# Patient Record
Sex: FEMALE | Race: WHITE | Employment: FULL TIME | URBAN - METROPOLITAN AREA
[De-identification: names, ages, dates, MRNs, and addresses within clinical notes are randomized per-mention and may not be internally consistent; named-entity substitution may affect disease eponyms.]

---

## 2020-09-23 ENCOUNTER — TELEPHONE (OUTPATIENT)
Dept: OBGYN CLINIC | Age: 35
End: 2020-09-23

## 2020-09-23 ENCOUNTER — OFFICE VISIT (OUTPATIENT)
Dept: OBGYN CLINIC | Age: 35
End: 2020-09-23
Payer: OTHER GOVERNMENT

## 2020-09-23 VITALS — DIASTOLIC BLOOD PRESSURE: 92 MMHG | SYSTOLIC BLOOD PRESSURE: 142 MMHG | WEIGHT: 209 LBS

## 2020-09-23 DIAGNOSIS — Z01.419 WELL FEMALE EXAM WITH ROUTINE GYNECOLOGICAL EXAM: Primary | ICD-10-CM

## 2020-09-23 PROCEDURE — 99395 PREV VISIT EST AGE 18-39: CPT | Performed by: OBSTETRICS & GYNECOLOGY

## 2020-09-23 RX ORDER — MEDROXYPROGESTERONE ACETATE 10 MG/1
10 TABLET ORAL DAILY
Qty: 30 TAB | Refills: 4 | Status: SHIPPED | OUTPATIENT
Start: 2020-09-23 | End: 2020-10-03

## 2020-09-23 RX ORDER — MEDROXYPROGESTERONE ACETATE 10 MG/1
10 TABLET ORAL DAILY
Qty: 30 TAB | Refills: 4 | Status: SHIPPED | OUTPATIENT
Start: 2020-09-23 | End: 2020-09-23

## 2020-09-23 RX ORDER — CETIRIZINE HYDROCHLORIDE 10 MG/1
CAPSULE, LIQUID FILLED ORAL
COMMUNITY
End: 2021-06-02 | Stop reason: ALTCHOICE

## 2020-09-23 NOTE — PROGRESS NOTES
Annual exam ages 21-44    [facility] is a No obstetric history on file. ,  28 y.o. female   Patient's last menstrual period was 2020. She presents for her annual checkup. She is having significant amenorrhea. 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 Menstrual status:    Her periods had been normal and regular until 2020. Contraception:    The current method of family planning is none. Sexual history:    She  reports being sexually active. She reports using the following method of birth control/protection: None. Medical conditions:    Since her last annual GYN exam about five years ago, she has not the following changes in her health history: none. Surgical history confirmed with patient. has a past surgical history that includes hx  section. Pap and Mammogram History:    No pap on file- pap today. The patient has never had a mammogram.    Breast Cancer History/Substance Abuse: negative    Past Medical History:   Diagnosis Date    Abnormal Papanicolaou smear of cervix      per pt HPV POS     Past Surgical History:   Procedure Laterality Date    HX  SECTION         Current Outpatient Medications   Medication Sig Dispense Refill    Cetirizine (ZyrTEC) 10 mg cap Take  by mouth.  ALPRAZolam (XANAX) 0.5 mg tablet Take 1 Tab by mouth nightly. Max Daily Amount: 0.5 mg. Prn insominia 30 Tab 5    buPROPion XL (WELLBUTRIN XL) 150 mg tablet Take 1 Tab by mouth every morning. Indications: ANXIETY WITH DEPRESSION 30 Tab 0    medroxyPROGESTERone (PROVERA) 10 mg tablet Take 1 Tab by mouth daily. 30 Tab 11    nystatin-triamcinolone (MYCOLOG) 100,000-0.1 unit/gram-% ointment Apply  to affected area two (2) times a day. 30 g 0     Allergies: Patient has no known allergies. Tobacco History:  reports that she has never smoked.  She has never used smokeless tobacco.  Alcohol Abuse:  reports no history of alcohol use.  Drug Abuse:  reports no history of drug use.     Family Medical/Cancer History:   Family History   Problem Relation Age of Onset    No Known Problems Mother         Review of Systems - History obtained from the patient  Constitutional: negative for weight loss, fever, night sweats  HEENT: negative for hearing loss, earache, congestion, snoring, sorethroat  CV: negative for chest pain, palpitations, edema  Resp: negative for cough, shortness of breath, wheezing  GI: negative for change in bowel habits, abdominal pain, black or bloody stools  : negative for frequency, dysuria, hematuria, vaginal discharge  MSK: negative for back pain, joint pain, muscle pain  Breast: negative for breast lumps, nipple discharge, galactorrhea  Skin :negative for itching, rash, hives  Neuro: negative for dizziness, headache, confusion, weakness  Psych: negative for anxiety, depression, change in mood  Heme/lymph: negative for bleeding, bruising, pallor    Physical Exam    Visit Vitals  BP (!) 142/92   Wt 209 lb (94.8 kg)   LMP 07/02/2020       Constitutional  · Appearance: well-nourished, well developed, alert, in no acute distress    HENT  · Head and Face: appears normal    Neck  · Inspection/Palpation: normal appearance, no masses or tenderness  · Lymph Nodes: no lymphadenopathy present  · Thyroid: gland size normal, nontender, no nodules or masses present on palpation    Chest  · Respiratory Effort: breathing unlabored  · Auscultation: normal breath sounds    Cardiovascular  · Heart:  · Auscultation: regular rate and rhythm without murmur    Breasts  · Inspection of Breasts: breasts symmetrical, no skin changes, no discharge present, nipple appearance normal, no skin retraction present  · Palpation of Breasts and Axillae: no masses present on palpation, no breast tenderness  · Axillary Lymph Nodes: no lymphadenopathy present    Gastrointestinal  · Abdominal Examination: abdomen non-tender to palpation, normal bowel sounds, no masses present  · Liver and spleen: no hepatomegaly present, spleen not palpable  · Hernias: no hernias identified    Genitourinary  · External Genitalia: normal appearance for age, no discharge present, no tenderness present, three 1-2 cm condyloma  · Vagina: normal vaginal vault without central or paravaginal defects, no discharge present, no inflammatory lesions present, no masses present  · Bladder: non-tender to palpation  · Urethra: appears normal  · Cervix: normal   · Uterus: normal size, shape and consistency  · Adnexa: no adnexal tenderness present, no adnexal masses present  · Perineum: perineum within normal limits, no evidence of trauma, no rashes or skin lesions present  · Anus: anus within normal limits, no hemorrhoids present  · Inguinal Lymph Nodes: no lymphadenopathy present    Skin  · General Inspection: no rash, no lesions identified    Neurologic/Psychiatric  · Mental Status:  · Orientation: grossly oriented to person, place and time  · Mood and Affect: mood normal, affect appropriate    . Assessment:  Routine gynecologic examination  Her current medical status is satisfactory with no evidence of significant gynecologic issues. Amenorrhea- will give provera withdrawal and will rto for 21 day progesterone and will draw pcos labs at that visit as well (17-oh progesterone, free and total testosterone, tsh, prolactin, sbbg).   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

## 2020-09-23 NOTE — PATIENT INSTRUCTIONS
Pelvic Exam: Care Instructions  Your Care Instructions     When your doctor examines all of your pelvic organs, it's called a pelvic exam. Two good reasons to have this kind of exam are to check for sexually transmitted infections (STIs) and to get a Pap test. A Pap test is also called a Pap smear. It checks for early changes that can lead to cancer of the cervix. Sometimes a pelvic exam is part of a regular checkup. Your doctor may ask you to avoid vaginal sex, tampons, vaginal medicines, vaginal sprays or powders, and douching for 1 to 2 days before the test.  Other times, women have this kind of exam at any time of the month. This is because they have pelvic pain, bleeding, or discharge. Or they may have another pelvic problem. Before your exam, it's important to share some information with your doctor. For example, if you are a survivor of rape or sexual abuse, you can talk about any concerns you may have. Your doctor will also want to know if you are pregnant or use birth control. And he or she will want to hear about any problems, surgeries, or procedures you have had in your pelvic area. You will also need to tell your doctor when your last period was. Follow-up care is a key part of your treatment and safety. Be sure to make and go to all appointments, and call your doctor if you are having problems. It's also a good idea to know your test results and keep a list of the medicines you take. How is a pelvic exam done? · During a pelvic exam, you will:  ? Take off your clothes below the waist. You will get a paper or cloth cover to put over the lower half of your body. If this is regular checkup, you may undress completely and put on a gown. ? Lie on your back on an exam table. Your feet will be raised above you. Stirrups will support your feet. · The doctor will:  ? Ask you to relax your knees. Your knees need to lean out, toward the walls. ?  Check the opening of your vagina for sores or swelling. ? Gently put a tool called a speculum into your vagina. It opens the vagina a little bit. You will feel some pressure. But if you are relaxed, it will not hurt. It lets your doctor see inside the vagina. ? Use a small brush, spatula, or swab to get a sample of cells, if you are having a Pap test or culture. The doctor then removes the speculum. ? Put on gloves and put one or two fingers of one hand into your vagina. The other hand goes on your lower belly. This lets your doctor feel your pelvic organs. You will probably feel some pressure. Try to stay relaxed. ? Put one gloved finger into your rectum and one into your vagina, if needed. This can also help check your pelvic organs. This exam takes about 10 minutes. At the end, you will get a washcloth or tissue to clean your vaginal area. You can then get dressed. Why is a pelvic exam done? A pelvic exam may be done:  · As part of a woman's regular physical checkup. The exam may include a Pap test.  · To check for vaginal infection. · To check for sexually transmitted infections, such as chlamydia or herpes. · To help find the cause of abnormal uterine bleeding. · To look for problems like uterine fibroids, ovarian cysts, or uterine prolapse. · To find the cause of pelvic or belly pain. · Before inserting an intrauterine device (IUD) for birth control. · To collect evidence if you've been sexually assaulted. What are the risks of a pelvic exam?  There is a small chance that the doctor will find something on a pelvic exam that would not have caused a problem. This is called overdiagnosis. It could lead to tests or treatment you don't need. When should you call for help? Watch closely for changes in your health, and be sure to contact your doctor if you have any problems. Where can you learn more? Go to http://kelly-nicolás.info/  Enter M421 in the search box to learn more about \"Pelvic Exam: Care Instructions. \"  Current as of: November 8, 2019               Content Version: 12.6  © 9314-1039 [employer]. Care instructions adapted under license by [employer] (which disclaims liability or warranty for this information). If you have questions about a medical condition or this instruction, always ask your healthcare professional. [address] any warranty or liability for your use of this information.

## 2020-10-06 LAB
CYTOLOGIST CVX/VAG CYTO: NORMAL
CYTOLOGY CVX/VAG DOC CYTO: NORMAL
CYTOLOGY CVX/VAG DOC THIN PREP: NORMAL
CYTOLOGY HISTORY:: NORMAL
DX ICD CODE: NORMAL
HPV I/H RISK 1 DNA CVX QL PROBE+SIG AMP: NEGATIVE
Lab: NORMAL
OTHER STN SPEC: NORMAL
STAT OF ADQ CVX/VAG CYTO-IMP: NORMAL

## 2020-10-13 ENCOUNTER — OFFICE VISIT (OUTPATIENT)
Dept: OBGYN CLINIC | Age: 35
End: 2020-10-13
Payer: OTHER GOVERNMENT

## 2020-10-13 DIAGNOSIS — A63.0 CONDYLOMA: Primary | ICD-10-CM

## 2020-10-13 PROCEDURE — 56606 BIOPSY OF VULVA/PERINEUM: CPT | Performed by: OBSTETRICS & GYNECOLOGY

## 2020-10-13 PROCEDURE — 56605 BIOPSY OF VULVA/PERINEUM: CPT | Performed by: OBSTETRICS & GYNECOLOGY

## 2020-10-13 NOTE — PROGRESS NOTES
[facility]  OFFICE PROCEDURE PROGRESS NOTE        Chart reviewed for the following:   Sophia VALLEJO, have reviewed the History, Physical and updated the Allergic reactions for Taryn JASON [address] performed immediately prior to start of procedure:   Sophia VALLEJO, have performed the following reviews on Nicho Vallejo prior to the start of the procedure:            * Patient was identified by name and date of birth   * Agreement on procedure being performed was verified  * Risks and Benefits explained to the patient  * Procedure site verified and marked as necessary  * Patient was positioned for comfort  * Consent was signed and verified     Time: 1050am      Date of procedure: 10/13/2020    Procedure performed by:  Jesus Montalvo MD    Provider assisted by: Giuliano Gomez     Patient assisted by: self    How tolerated by patient: tolerated the procedure well with no complications    Post Procedural Pain Scale: 0 - No Hurt    Comments: none    Procedure note: Vulvar/Vaginal condyloma removal    Indications:  Nicho Vallejo is a 28 y.o. female [facility] that was found to have multiple condyloma After being presented with the risks, benefits and specific details of the procedure, she had no further questions. Procedure: The patient was placed on the table in a dorsal lithotomy position and draped in the appropriate manner. The area was prepped with Zephiran . Once cleansed, the areas were injected with 15 cc's of 1% Lidocaine without epinephrine, using a 25 gauge needle. After adequate anesthesia was reached, the lesions were grasped and easily excised with the scalpel. They ranged in size from 1 cm to 2 cm. The specimens were placed in formalin and sent to pathology for evaluation. There were a total of 4 masses removed. Pressure was applied to the biopsy site to control bleeding and no sutures were placed to close the wound.  Hemostasis was adequate with direct pressure and silver nitrate. The patient tolerated the procedure well. There were no complications. She was observed for 10 minutes and was discharged in good condition.

## 2021-03-25 ENCOUNTER — OFFICE VISIT (OUTPATIENT)
Dept: INTERNAL MEDICINE CLINIC | Age: 36
End: 2021-03-25
Payer: COMMERCIAL

## 2021-03-25 VITALS
SYSTOLIC BLOOD PRESSURE: 118 MMHG | OXYGEN SATURATION: 98 % | HEIGHT: 60 IN | BODY MASS INDEX: 42.41 KG/M2 | DIASTOLIC BLOOD PRESSURE: 80 MMHG | WEIGHT: 216 LBS | TEMPERATURE: 97.8 F | RESPIRATION RATE: 17 BRPM | HEART RATE: 84 BPM

## 2021-03-25 DIAGNOSIS — E66.9 OBESITY (BMI 30-39.9): ICD-10-CM

## 2021-03-25 DIAGNOSIS — F32.A DEPRESSION, UNSPECIFIED DEPRESSION TYPE: ICD-10-CM

## 2021-03-25 DIAGNOSIS — Z76.89 ENCOUNTER TO ESTABLISH CARE: Primary | ICD-10-CM

## 2021-03-25 DIAGNOSIS — E55.9 VITAMIN D DEFICIENCY: ICD-10-CM

## 2021-03-25 DIAGNOSIS — F41.9 ANXIETY: ICD-10-CM

## 2021-03-25 PROCEDURE — 99203 OFFICE O/P NEW LOW 30 MIN: CPT | Performed by: INTERNAL MEDICINE

## 2021-03-25 RX ORDER — HYDROXYZINE PAMOATE 25 MG/1
25 CAPSULE ORAL
Qty: 30 CAP | Refills: 0 | Status: SHIPPED | OUTPATIENT
Start: 2021-03-25 | End: 2021-04-08

## 2021-03-25 RX ORDER — ESCITALOPRAM OXALATE 10 MG/1
10 TABLET ORAL DAILY
Qty: 30 TAB | Refills: 2 | Status: SHIPPED | OUTPATIENT
Start: 2021-03-25 | End: 2021-05-06 | Stop reason: ALTCHOICE

## 2021-03-25 NOTE — PROGRESS NOTES
Health Maintenance Due   Topic Date Due    Hepatitis C Screening  Never done    Flu Vaccine (1) Never done       Chief Complaint   Patient presents with    New Patient    Depression       1. Have you been to the ER, urgent care clinic since your last visit? Hospitalized since your last visit? No    2. Have you seen or consulted any other health care providers outside of the [address] [place] [address] since your last visit? Include any pap smears or colon screening. No    3) Do you have an Advance Directive on file? no    4) Are you interested in receiving information on Advance Directives? NO      Patient is accompanied by self I have received verbal consent from Selina Johnson to discuss any/all medical information while they are present in the room.

## 2021-03-25 NOTE — PROGRESS NOTES
HISTORY OF PRESENT ILLNESS  Sigrid Ricardo is a 28 y.o. female. Pt presents to clinic today to establish care. Past medical, surgical, and family history reviewed and updated. No significant history to report. Has been experiencing more symptoms of depression recently. Has noticed decreased motivation during the day and loss of pleasure in activities she used to find enjoyable. Reports waking up experiencing panic attacks sometimes, and not able to handle everyday stressorts and busy work as well as she used to. Increased crying episodes as well. Has been dealing with several stressors in the home due to unfaithful partner. Reports has a good support system, such as 12yr old son and her co-workers. 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 Is currently trying to get pregant     Denies SI/HI. Has never been on medication before but is interested at this time. Also thinks she has some OCD tendencies as she becomes frustrated if work space is not clean. Has been seeing a counselor through [facility] for past few months which has been helpful. Trying to work on goals such as increasing activities that she enjoys, such as photography. Only other concern is occasional epigastric pain which occurs in the evenings. Taking anti gas pills has helped and taking warm baths. Does feel better after vomiting. Denies blood in vomit. Visit Vitals  /80 (BP 1 Location: Right upper arm, BP Patient Position: Sitting, BP Cuff Size: Adult)   Pulse 84   Temp 97.8 °F (36.6 °C) (Oral)   Resp 17   Ht 5' (1.524 m)   Wt 216 lb (98 kg)   LMP 02/11/2021 (Exact Date)   SpO2 98%   BMI 42.18 kg/m²       Review of Systems   Constitutional: Negative for chills and fever. HENT: Negative. Eyes: Negative. Respiratory: Negative. Cardiovascular: Negative for chest pain and palpitations. Gastrointestinal: Negative. Genitourinary: Negative. Musculoskeletal: Negative. Skin: Negative.     Neurological: Negative for dizziness. Psychiatric/Behavioral: Positive for depression. Negative for suicidal ideas. The patient is nervous/anxious. The patient does not have insomnia. Past Medical History:   Diagnosis Date    Abnormal Papanicolaou smear of cervix     2007 per pt HPV POS    Depression      Social History     Socioeconomic History    Marital status: LEGALLY      Spouse name: Not on file    Number of children: Not on file    Years of education: Not on file    Highest education level: Not on file   Tobacco Use    Smoking status: Never Smoker    Smokeless tobacco: Never Used   Substance and Sexual Activity    Alcohol use: No    Drug use: No    Sexual activity: Yes     Birth control/protection: None     Past Surgical History:   Procedure Laterality Date    HX  SECTION       Current Outpatient Medications on File Prior to Visit   Medication Sig Dispense Refill    omega-3s/dha/epa/fish oil/D3 (VITAMIN-D + OMEGA-3 PO) Take  by mouth.  Cetirizine (ZyrTEC) 10 mg cap Take  by mouth. No current facility-administered medications on file prior to visit. Visit Vitals  /80 (BP 1 Location: Right upper arm, BP Patient Position: Sitting, BP Cuff Size: Adult)   Pulse 84   Temp 97.8 °F (36.6 °C) (Oral)   Resp 17   Ht 5' (1.524 m)   Wt 216 lb (98 kg)   LMP 2021 (Exact Date)   SpO2 98%   BMI 42.18 kg/m²     Physical Exam  Constitutional:       Appearance: Normal appearance. She is well-groomed. HENT:      Head: Normocephalic. Eyes:      General: Lids are normal.   Neck:      Musculoskeletal: Normal range of motion. Cardiovascular:      Rate and Rhythm: Normal rate and regular rhythm. Heart sounds: Normal heart sounds. Pulmonary:      Effort: Pulmonary effort is normal.      Breath sounds: Normal breath sounds. Abdominal:      General: Bowel sounds are normal.      Palpations: Abdomen is soft. Musculoskeletal: Normal range of motion.    Skin:     General: Skin is warm and dry.   Neurological:      General: No focal deficit present.   Psychiatric:         Attention and Perception: Attention normal.         Mood and Affect: Affect normal. Mood is anxious and depressed.         Speech: Speech normal.         Behavior: Behavior normal. Behavior is cooperative.         Cognition and Memory: Cognition normal.         ASSESSMENT and PLAN  Encounter Diagnoses   Name Primary?   • Encounter to establish care Yes   • Depression, unspecified depression type    • Anxiety    • Obesity (BMI 30-39.9)    • Vitamin D deficiency      Orders Placed This Encounter   • METABOLIC PANEL, COMPREHENSIVE   • CBC WITH AUTOMATED DIFF   • TSH 3RD GENERATION   • VITAMIN D, 25 HYDROXY   • HEMOGLOBIN A1C WITH EAG   • omega-3s/dha/epa/fish oil/D3 (VITAMIN-D + OMEGA-3 PO)   • escitalopram oxalate (LEXAPRO) 10 mg tablet   • hydrOXYzine pamoate (VISTARIL) 25 mg capsule     Diagnoses and all orders for this visit:    1. Encounter to establish care    2. Depression, unspecified depression type  -     METABOLIC PANEL, COMPREHENSIVE; Future  -     CBC WITH AUTOMATED DIFF; Future  -     TSH 3RD GENERATION; Future  -     escitalopram oxalate (LEXAPRO) 10 mg tablet; Take 1 Tab by mouth daily.   - Labs ordered as above.    - Start Lexapro at low dose daily. Plan, purpose, and side effects reviewed.    - Continue with counselor    3. Anxiety  -     escitalopram oxalate (LEXAPRO) 10 mg tablet; Take 1 Tab by mouth daily.  -     hydrOXYzine pamoate (VISTARIL) 25 mg capsule; Take 1 Cap by mouth three (3) times daily as needed for Anxiety for up to 14 days.   - For intermittent panic attacks, prescribed Vistaril as above. Plan, purpose and side effects reviewed.     4. Obesity (BMI 30-39.9)  -     HEMOGLOBIN A1C WITH EAG; Future    5. Vitamin D deficiency  -     VITAMIN D, 25 HYDROXY; Future      F/U based on lab reslts. F/U appt in 6 weeks for medication follow-up.     Merry Rebolledo NP                               Consent For Provider Observation  [facility] (\"[facility]\") has agreed to permit a provider employed by [facility] (\"Observer\") to observe care to patients treated in its physician practices. Your physician has agreed to permit such Observer to observe his/her patient care activities. Such observation will not include any direct participation in the care provided to you. This writer has obtained verbal permission from this patient to permit Observer to observe their medical care received at [facility]. This patient agrees that they have been given the opportunity to refuse to give such consent and that they may withdraw their consent at any time, except to the extent [facility] has relied on this consent and an Observer has already observed their medical care. This consent shall remain in effect for 1 year, unless otherwise withdrawn by this patient.

## 2021-03-26 LAB
25(OH)D3+25(OH)D2 SERPL-MCNC: 42.7 NG/ML (ref 30–100)
ALBUMIN SERPL-MCNC: 4.6 G/DL (ref 3.8–4.8)
ALBUMIN/GLOB SERPL: 1.8 {RATIO} (ref 1.2–2.2)
ALP SERPL-CCNC: 76 IU/L (ref 39–117)
ALT SERPL-CCNC: 59 IU/L (ref 0–32)
AST SERPL-CCNC: 33 IU/L (ref 0–40)
BASOPHILS # BLD AUTO: 0.1 X10E3/UL (ref 0–0.2)
BASOPHILS NFR BLD AUTO: 1 %
BILIRUB SERPL-MCNC: 0.3 MG/DL (ref 0–1.2)
BUN SERPL-MCNC: 14 MG/DL (ref 6–20)
BUN/CREAT SERPL: 24 (ref 9–23)
CALCIUM SERPL-MCNC: 9.4 MG/DL (ref 8.7–10.2)
CHLORIDE SERPL-SCNC: 103 MMOL/L (ref 96–106)
CO2 SERPL-SCNC: 25 MMOL/L (ref 20–29)
CREAT SERPL-MCNC: 0.58 MG/DL (ref 0.57–1)
EOSINOPHIL # BLD AUTO: 0.3 X10E3/UL (ref 0–0.4)
EOSINOPHIL NFR BLD AUTO: 3 %
ERYTHROCYTE [DISTWIDTH] IN BLOOD BY AUTOMATED COUNT: 13.6 % (ref 11.7–15.4)
EST. AVERAGE GLUCOSE BLD GHB EST-MCNC: 108 MG/DL
GLOBULIN SER CALC-MCNC: 2.5 G/DL (ref 1.5–4.5)
GLUCOSE SERPL-MCNC: 101 MG/DL (ref 65–99)
HBA1C MFR BLD: 5.4 % (ref 4.8–5.6)
HCT VFR BLD AUTO: 44.3 % (ref 34–46.6)
HGB BLD-MCNC: 14.6 G/DL (ref 11.1–15.9)
IMM GRANULOCYTES # BLD AUTO: 0.1 X10E3/UL (ref 0–0.1)
IMM GRANULOCYTES NFR BLD AUTO: 1 %
LYMPHOCYTES # BLD AUTO: 2.7 X10E3/UL (ref 0.7–3.1)
LYMPHOCYTES NFR BLD AUTO: 27 %
MCH RBC QN AUTO: 28.4 PG (ref 26.6–33)
MCHC RBC AUTO-ENTMCNC: 33 G/DL (ref 31.5–35.7)
MCV RBC AUTO: 86 FL (ref 79–97)
MONOCYTES # BLD AUTO: 0.6 X10E3/UL (ref 0.1–0.9)
MONOCYTES NFR BLD AUTO: 6 %
NEUTROPHILS # BLD AUTO: 6.2 X10E3/UL (ref 1.4–7)
NEUTROPHILS NFR BLD AUTO: 62 %
PLATELET # BLD AUTO: 269 X10E3/UL (ref 150–450)
POTASSIUM SERPL-SCNC: 4.2 MMOL/L (ref 3.5–5.2)
PROT SERPL-MCNC: 7.1 G/DL (ref 6–8.5)
RBC # BLD AUTO: 5.14 X10E6/UL (ref 3.77–5.28)
SODIUM SERPL-SCNC: 141 MMOL/L (ref 134–144)
TSH SERPL DL<=0.005 MIU/L-ACNC: 2.08 UIU/ML (ref 0.45–4.5)
WBC # BLD AUTO: 9.9 X10E3/UL (ref 3.4–10.8)

## 2021-03-29 NOTE — PROGRESS NOTES
Letter mailed to patient with results and recommendations from provider  Call placed to patient and left message to call back

## 2021-03-30 DIAGNOSIS — R74.8 ELEVATED LIVER ENZYMES: Primary | ICD-10-CM

## 2021-05-06 ENCOUNTER — OFFICE VISIT (OUTPATIENT)
Dept: INTERNAL MEDICINE CLINIC | Age: 36
End: 2021-05-06
Payer: COMMERCIAL

## 2021-05-06 VITALS
HEART RATE: 92 BPM | BODY MASS INDEX: 43 KG/M2 | WEIGHT: 219 LBS | HEIGHT: 60 IN | RESPIRATION RATE: 16 BRPM | SYSTOLIC BLOOD PRESSURE: 110 MMHG | TEMPERATURE: 99.3 F | DIASTOLIC BLOOD PRESSURE: 70 MMHG | OXYGEN SATURATION: 97 %

## 2021-05-06 DIAGNOSIS — F41.9 ANXIETY: ICD-10-CM

## 2021-05-06 DIAGNOSIS — F32.A DEPRESSION, UNSPECIFIED DEPRESSION TYPE: Primary | ICD-10-CM

## 2021-05-06 DIAGNOSIS — R53.83 FATIGUE, UNSPECIFIED TYPE: ICD-10-CM

## 2021-05-06 PROCEDURE — 99214 OFFICE O/P EST MOD 30 MIN: CPT | Performed by: INTERNAL MEDICINE

## 2021-05-06 RX ORDER — BUPROPION HYDROCHLORIDE 75 MG/1
75 TABLET ORAL 2 TIMES DAILY
Qty: 60 TAB | Refills: 1 | Status: SHIPPED | OUTPATIENT
Start: 2021-05-06 | End: 2021-07-08 | Stop reason: ALTCHOICE

## 2021-05-06 RX ORDER — LORATADINE AND PSEUDOEPHEDRINE SULFATE 10; 240 MG/1; MG/1
1 TABLET, EXTENDED RELEASE ORAL DAILY
COMMUNITY

## 2021-05-06 NOTE — PROGRESS NOTES
HISTORY OF PRESENT ILLNESS  Deepa Solitario is a 28 y.o. female. Patient was seen for a follow up. Was started on Lexapro a few weeks back. Reports that she doesn't necessarily feel a change, but noticed that she is not \" on edge as much\". States that she has gotten more sleep and now goes to bed earlier, but is tried when she wakes up. Reports that she needs to lose some weight as well. Has not seen psych since last visit. Reports that she only has a few visit in general and does no pair well with the therapist.   Reports some dull pain that waxes and wanes to the lower right stomach. Reports no fever or trouble when she eats. Will repeat CMP after elevated ALT. Visit Vitals  /70   Pulse 92   Temp 99.3 °F (37.4 °C) (Oral)   Resp 16   Ht 5' (1.524 m)   Wt 219 lb (99.3 kg)   LMP 2021   SpO2 97%   BMI 42.77 kg/m²     Past Medical History:   Diagnosis Date    Abnormal Papanicolaou smear of cervix      per pt HPV POS    Depression      Past Surgical History:   Procedure Laterality Date    HX  SECTION       Family History   Problem Relation Age of Onset    Depression Mother     Alcohol abuse Sister      Outpatient Encounter Medications as of 2021   Medication Sig Dispense Refill    loratadine-pseudoephedrine (Claritin-D 24 Hour)  mg per tablet Take 1 Tab by mouth daily.  buPROPion (WELLBUTRIN) 75 mg tablet Take 1 Tab by mouth two (2) times a day. 60 Tab 1    omega-3s/dha/epa/fish oil/D3 (VITAMIN-D + OMEGA-3 PO) Take  by mouth.  [DISCONTINUED] escitalopram oxalate (LEXAPRO) 10 mg tablet Take 1 Tab by mouth daily. 30 Tab 2    Cetirizine (ZyrTEC) 10 mg cap Take  by mouth. No facility-administered encounter medications on file as of 2021. HPI    Review of Systems   Constitutional: Positive for malaise/fatigue. Respiratory: Negative. Cardiovascular: Negative. Gastrointestinal: Negative. Musculoskeletal: Negative. Neurological: Negative. Psychiatric/Behavioral: Positive for depression. The patient is nervous/anxious. Physical Exam  Vitals signs and nursing note reviewed. Constitutional:       Appearance: She is obese. Cardiovascular:      Rate and Rhythm: Normal rate and regular rhythm. Pulmonary:      Effort: Pulmonary effort is normal.      Breath sounds: Normal breath sounds. Abdominal:      Palpations: Abdomen is soft. Skin:     General: Skin is warm. Neurological:      Mental Status: She is alert and oriented to person, place, and time. Psychiatric:         Behavior: Behavior normal.         ASSESSMENT and PLAN  Diagnoses and all orders for this visit:    1. Depression, unspecified depression type  -     REFERRAL TO PSYCHIATRY  -     buPROPion (WELLBUTRIN) 75 mg tablet; Take 1 Tab by mouth two (2) times a day. 2. Anxiety  -     buPROPion (WELLBUTRIN) 75 mg tablet; Take 1 Tab by mouth two (2) times a day.     3. Fatigue, unspecified type      Follow-up and Dispositions    · Return in about 3 months (around 8/6/2021), or if symptoms worsen or fail to improve, for Follow up.       lab results and schedule of future lab studies reviewed with patient  reviewed diet, exercise and weight control  reviewed medications and side effects in detail

## 2021-05-06 NOTE — PROGRESS NOTES
Chief Complaint   Patient presents with    Abnormal liver tests     elevated liver enzymes  6 week f/u         1. Have you been to the ER, urgent care clinic since your last visit? No  Hospitalized since your last visit? No    2. Have you seen or consulted any other health care providers outside of the [address] [place] [address] since your last visit? Include any pap smears or colon screening.   no

## 2021-05-07 LAB
ALBUMIN SERPL-MCNC: 4.5 G/DL (ref 3.8–4.8)
ALBUMIN/GLOB SERPL: 1.7 {RATIO} (ref 1.2–2.2)
ALP SERPL-CCNC: 80 IU/L (ref 39–117)
ALT SERPL-CCNC: 35 IU/L (ref 0–32)
AST SERPL-CCNC: 28 IU/L (ref 0–40)
BILIRUB SERPL-MCNC: 0.3 MG/DL (ref 0–1.2)
BUN SERPL-MCNC: 15 MG/DL (ref 6–20)
BUN/CREAT SERPL: 23 (ref 9–23)
CALCIUM SERPL-MCNC: 9.9 MG/DL (ref 8.7–10.2)
CHLORIDE SERPL-SCNC: 103 MMOL/L (ref 96–106)
CO2 SERPL-SCNC: 26 MMOL/L (ref 20–29)
CREAT SERPL-MCNC: 0.64 MG/DL (ref 0.57–1)
GLOBULIN SER CALC-MCNC: 2.6 G/DL (ref 1.5–4.5)
GLUCOSE SERPL-MCNC: 96 MG/DL (ref 65–99)
POTASSIUM SERPL-SCNC: 4.8 MMOL/L (ref 3.5–5.2)
PROT SERPL-MCNC: 7.1 G/DL (ref 6–8.5)
SODIUM SERPL-SCNC: 141 MMOL/L (ref 134–144)

## 2021-06-02 ENCOUNTER — OFFICE VISIT (OUTPATIENT)
Dept: SURGERY | Age: 36
End: 2021-06-02
Payer: COMMERCIAL

## 2021-06-02 VITALS
TEMPERATURE: 98 F | HEIGHT: 60 IN | BODY MASS INDEX: 43.09 KG/M2 | RESPIRATION RATE: 20 BRPM | HEART RATE: 88 BPM | SYSTOLIC BLOOD PRESSURE: 140 MMHG | WEIGHT: 219.5 LBS | OXYGEN SATURATION: 95 % | DIASTOLIC BLOOD PRESSURE: 86 MMHG

## 2021-06-02 DIAGNOSIS — E66.9 MILDLY OBESE: Primary | ICD-10-CM

## 2021-06-02 PROCEDURE — 99203 OFFICE O/P NEW LOW 30 MIN: CPT | Performed by: INTERNAL MEDICINE

## 2021-06-02 RX ORDER — PHENTERMINE HYDROCHLORIDE 37.5 MG/1
37.5 TABLET ORAL
Qty: 30 TABLET | Refills: 0 | Status: SHIPPED | OUTPATIENT
Start: 2021-06-02 | End: 2021-06-29 | Stop reason: SDUPTHER

## 2021-06-02 NOTE — PROGRESS NOTES
[facility] Medically Supervised   [facility] Loss Program     INITIAL PHYSICIAN VISIT    HISTORY OF PRESENT ILLNESS  Agree with nurse and registered dietician notes. Aristeo Gil is a 28 y.o. female with  Obesity Body mass index is 42.87 kg/m². and associated health concerns presents for evaluation and treatment of weight management. How did you hear about the [facility] program? PCP     Have you ever participated in any other weight loss programs, self directed or commercial? If so, maximum weight loss? 20lbs    Do you have any current major lifestyle changes? Normal stress       Weight History    Current weight 219. Maximum weight 239bs. Goal weight 155. Have you ever taken weight loss medications or herbal remedies? no               Have you or anyone in your family ever had weight loss surgery? no       Eating Habits  How many times a week do you eat fast food or at restaurants? Occasionally   Are you skipping meals and if so, why? Skip breakfast a lot   Which meals do you eat? Lunch, dinner and snacking   Do you have upcoming travel in the next 6 weeks? no  Do you have a history of binge eating disorder, anorexia, and bulimia? no              If yes, how long ago and how was it treated? n/a       Drinking Habits  How much caffeine do you drink daily? Not daily   How much alcohol do you drink daily? none  Do you consume any sugar-sweetened beverages (sodas, teas, juices, etc.)? Not oftne   How much water do you consume daily? Around 40 oz       Sleep Habits  Do you sleep between 7-9 hours a night? More 5 hours. Works weekends and overnight. Gets more on her days off  Do you snore? no  STOP BANG Score:  Have you been diagnosed with Sleep Apnea in the past?  Do you regularly use a CPAP machine?         Exercise  How many days a week do you currently exercise? no  Have you ever been told by a physician not to exercise? no  Do you know of any reason you shouldn't exercise? no  Do you own a pedometer or fitness tracker? no  Do you have a gym membership? no  What's your favorite physical activity? walking       Other History  Any history of drug abuse or dependence? no  Are you pregnant or planning on becoming pregnant within 6 months? no  How many times have you been pregnant? 1  Any potential unsupportive people in your life? no  Are you ready to lose weight and become healthier? yes       Other Medical Care         ICD-10-CM ICD-9-CM    1. Mildly obese  E66.9 278.00 REFERRAL TO DIETITIAN      phentermine (ADIPEX-P) 37.5 mg tablet       Depression Screening    3 most recent PHQ Screens 3/25/2021   Little interest or pleasure in doing things Nearly every day   Feeling down, depressed, irritable, or hopeless Nearly every day   Total Score PHQ 2 6   Trouble falling or staying asleep, or sleeping too much Nearly every day   Feeling tired or having little energy Nearly every day   Poor appetite, weight loss, or overeating Nearly every day   Feeling bad about yourself - or that you are a failure or have let yourself or your family down Nearly every day   Trouble concentrating on things such as school, work, reading, or watching TV Nearly every day   Moving or speaking so slowly that other people could have noticed; or the opposite being so fidgety that others notice Nearly every day   Thoughts of being better off dead, or hurting yourself in some way Not at all   PHQ 9 Score 24   How difficult have these problems made it for you to do your work, take care of your home and get along with others Not difficult at all        Pt denies any contraindications to VLCD including: history of MI in the last 3 months, Type 1 DM, Type 2 DM, Liver or Kidney disease requiring protein restriction, Recent treatment for Cancer, History of Uric-acid Kidney Stone, Gout, Gall stones, Recent onset of Inflammatory Bowel Disease, or severe Food Allergies. ROS:    Review of Systems negative except as noted above in HPI.        ALLERGIES:  No Known Allergies    CURRENT MEDICATIONS:    Outpatient Medications Marked as Taking for the 21 encounter (Office Visit) with Julee Walker NP   Medication Sig Dispense Refill    phentermine (ADIPEX-P) 37.5 mg tablet Take 1 Tablet by mouth every morning. Max Daily Amount: 37.5 mg. 30 Tablet 0    loratadine-pseudoephedrine (Claritin-D 24 Hour)  mg per tablet Take 1 Tab by mouth daily.  buPROPion (WELLBUTRIN) 75 mg tablet Take 1 Tab by mouth two (2) times a day. 60 Tab 1    omega-3s/dha/epa/fish oil/D3 (VITAMIN-D + OMEGA-3 PO) Take  by mouth. PAST MEDICAL HISTORY:    Past Medical History:   Diagnosis Date    Abnormal Papanicolaou smear of cervix      per pt HPV POS    Depression     Psychotic disorder ([facility] [address]     stress anxiety       PAST SURGICAL HISTORY:    Past Surgical History:   Procedure Laterality Date    HX  SECTION         FAMILY HISTORY:    Family History   Problem Relation Age of Onset    Depression Mother     Alcohol abuse Sister        SOCIAL HISTORY:    Social History     Socioeconomic History    Marital status: LEGALLY      Spouse name: Not on file    Number of children: Not on file    Years of education: Not on file    Highest education level: Not on file   Tobacco Use    Smoking status: Never Smoker    Smokeless tobacco: Never Used   Vaping Use    Vaping Use: Never used   Substance and Sexual Activity    Alcohol use: Yes     Comment: beer/wine    Drug use: No    Sexual activity: Yes     Partners: Male     Birth control/protection: None     Social Determinants of Health     Financial Resource Strain:     Difficulty of Paying Living Expenses:    Food Insecurity:     Worried About Running Out of Food in the Last Year:     Ran Out of Food in the Last Year:    Transportation Needs:     Lack of Transportation (Medical):      Lack of Transportation (Non-Medical):    Physical Activity:     Days of Exercise per Week:     Minutes of Exercise per Session:    Stress:     Feeling of Stress :    Social Connections:     Frequency of Communication with Friends and Family:     Frequency of Social Gatherings with Friends and Family:     Attends Bahai Services:     Active Member of Clubs or Organizations:     Attends Club or Organization Meetings:     Marital Status:        IMMUNIZATIONS:    There is no immunization history on file for this patient. PHYSICAL EXAMINATION    Vital Signs    Visit Vitals  BP (!) 140/86   Pulse 88   Temp 98 °F (36.7 °C)   Resp 20   Ht 5' (1.524 m)   Wt 219 lb 8 oz (99.6 kg)   SpO2 95%   BMI 42.87 kg/m²       Weight Metrics 6/2/2021 5/6/2021 3/25/2021 9/23/2020   Weight 219 lb 8 oz 219 lb 216 lb 209 lb   BMI 42.87 kg/m2 42.77 kg/m2 42.18 kg/m2 -         General appearance - Well nourished. Well appearing. Well developed. No acute distress. Obese. Head - Normocephalic. Atraumatic. Eyes - pupils equal and reactive. Extraocular eye movements intact. Sclera anicteric. Mildly injected sclera. Ears - Hearing is grossly normal bilaterally. Nose - normal and patent. No polyps noted. No erythema. No discharge. Mouth - mucous membranes with adequate moisture. Posterior pharynx normal with cobblestone appearance. No erythema, white exudate or obstruction. Neck - supple. Midline trachea. No carotid bruits noted bilaterally. No thyromegaly noted. Chest - clear to auscultation bilaterally anteriorly and posteriorly. No wheezes. No rales or rhonchi. Breath sounds are symmetrical bilaterally. Unlabored respirations. Heart - normal rate. Regular rhythm. Normal S1, S2. No murmur noted. No rubs, clicks or gallops noted. Abdomen - soft and distended. No masses or organomegaly. No rebound, rigidity or guarding. Bowel sounds normal x 4 quadrants. No tenderness noted. Neurological - awake, alert and oriented to person, place, and time and event. Cranial nerves II through XII intact. Clear speech. Muscle strength is +5/5 x 4 extremities. Sensation is intact to light touch bilaterally. Steady gait. Heme/Lymph - peripheral pulses normal x 4 extremities. No peripheral edema is noted. Musculoskeletal - Intact x 4 extremities. Full ROM x 4 extremities. No pain with movement. Back exam - normal range of motion. No pain on palpation of the spinous processes in the cervical, thoracic, lumbar, sacral regions. No CVA tenderness. No buffalo hump noted. Skin - no rashes, erythema, ecchymosis, lacerations, abrasions, suspicious moles noted. No skin tags or moles. No acanthosis nigricans noted in the axilla or neck. Psychological -   normal behavior, dress and thought processes. Good insight. Good eye contact. Normal affect. Appropriate mood. Normal speech. DATA REVIEWED    Last labs were reviewed and stable     SEE SCANNED DOCUMENT FOR COMPLETE PANEL RESULTS. ASSESSMENT and PLAN    ICD-10-CM ICD-9-CM    1. Mildly obese  E66.9 278.00 REFERRAL TO DIETITIAN      phentermine (ADIPEX-P) 37.5 mg tablet      Discussed the need to increase water intake to half body weight. Will get more rest as able. Work hours do not allow during her 3 days back to back   Will assess food labels and see a dietician. Chart reviewed and updated. Discussed the patient's BMI with her. The BMI follow up plan is as follows: Referred to Dietitian  I have counseled this patient on diet and exercise regimens. Decrease carbohydrates (white foods, sweet foods, sweet drinks and alcohol), increase green leafy vegetables and protein (lean meats and beans) with each meal.  Avoid fried foods. Do not skip meals. Increase water intake. Avoid sugar-sweetened beverages. Get 7-8 hours uninterrupted sleep nightly. Diet prescription: VLCD (very low calorie diet)/800 calories daily using 3-4 meal replacements daily with Crystal Ferrera food/beverage products recommended.  Consume a minimum of 2 L (67 oz) of water daily while utilizing VLCD. Avoid sugar-sweetened beverages. Exercise prescription: Minimal and as tolerated while using VLCD. Sleep prescription: A goal of 7-9 hours of uninterrupted sleep is recommended to turn off the Grehlin hormone to be released from the stomach and triggers appetite while promoting weight gain. Proper rest turns on Leptin hormone to be released from white adipose tissue and promotes weight loss. Continue current medication and care. Prescriptions written and sent to pharmacy; medication side effects discussed. Reviewed and discussed [facility] lab results. [facility] to share with PCP and specialists. .     Recent office visit notes from PCP reviewed. Referrals given; patient urged to keep appointments with specialists. Weight loss goal of 5-10% in 6-12 months has shown significant improvement in obesity and its health consequences. Immunizations noted. Offered empathy, support, legitimation, prayers, partnership to patient. Praised patient for progress. Patient was offered a choice/choices in the treatment plan today. Patient expresses understanding of the plan and agrees with recommendations. More than 35  mins spent face to face with patient and more than 50% of this time spent in counseling and coordinating care and/or discussing treatment plans in reference to The encounter diagnosis was Mildly obese. Jose L Matthews has a reminder for a \"due or due soon\" health maintenance. I have asked pt to contact their primary care provider for follow-up on this health maintenance. Via Asha Renee, Maik Shah NP FOR ALLOWING ME THE PRIVILEGE TO PARTICIPATE IN THE CARE OF OUR MUTUAL PATIENT, Taryn Morales WITH RESPECT TO WEIGHT MANAGEMENT. There are no Patient Instructions on file for this visit.

## 2021-06-02 NOTE — PROGRESS NOTES
1. Have you been to the ER, urgent care clinic since your last visit? Hospitalized since your last visit? No    2. Have you seen or consulted any other health care providers outside of the [address] [place] [address] since your last visit? Include any pap smears or colon screening.  No     Neck 16.25 inches  Waist 47.25 inches  Fat 43.9%  BMI 42.7

## 2021-06-29 DIAGNOSIS — E66.9 MILDLY OBESE: ICD-10-CM

## 2021-06-29 RX ORDER — PHENTERMINE HYDROCHLORIDE 37.5 MG/1
37.5 TABLET ORAL
Qty: 30 TABLET | Refills: 0 | Status: SHIPPED | OUTPATIENT
Start: 2021-06-29 | End: 2021-08-03 | Stop reason: SDUPTHER

## 2021-07-08 ENCOUNTER — OFFICE VISIT (OUTPATIENT)
Dept: INTERNAL MEDICINE CLINIC | Age: 36
End: 2021-07-08
Payer: COMMERCIAL

## 2021-07-08 VITALS
TEMPERATURE: 97.8 F | HEIGHT: 60 IN | DIASTOLIC BLOOD PRESSURE: 88 MMHG | SYSTOLIC BLOOD PRESSURE: 124 MMHG | WEIGHT: 211 LBS | OXYGEN SATURATION: 97 % | HEART RATE: 97 BPM | BODY MASS INDEX: 41.43 KG/M2

## 2021-07-08 DIAGNOSIS — F32.A DEPRESSION, UNSPECIFIED DEPRESSION TYPE: Primary | ICD-10-CM

## 2021-07-08 DIAGNOSIS — E66.9 OBESITY (BMI 30-39.9): ICD-10-CM

## 2021-07-08 DIAGNOSIS — F41.9 ANXIETY: ICD-10-CM

## 2021-07-08 PROCEDURE — 99214 OFFICE O/P EST MOD 30 MIN: CPT | Performed by: INTERNAL MEDICINE

## 2021-07-08 RX ORDER — BUPROPION HYDROCHLORIDE 150 MG/1
150 TABLET ORAL
Qty: 60 TABLET | Refills: 1 | Status: SHIPPED | OUTPATIENT
Start: 2021-07-08 | End: 2021-09-07 | Stop reason: SDUPTHER

## 2021-07-08 NOTE — PROGRESS NOTES
Chief Complaint   Patient presents with    Depression     F/u on medication. Visit Vitals  /88 (BP 1 Location: Left arm, BP Patient Position: Sitting)   Pulse 97   Temp 97.8 °F (36.6 °C) (Oral)   Ht 5' (1.524 m)   Wt 211 lb (95.7 kg)   SpO2 97%   BMI 41.21 kg/m²     1. Have you been to the ER, urgent care clinic since your last visit? Hospitalized since your last visit?no    2. Have you seen or consulted any other health care providers outside of the [address] [place] [address] since your last visit? Include any pap smears or colon screening.  no

## 2021-07-08 NOTE — PROGRESS NOTES
HISTORY OF PRESENT ILLNESS  Charleen Bright is a 39 y.o. female. Patient was seen for a follow up at PCP. Reports that she believes that she is overall doing well. Has been taking her Wellbutrin daily, but would like to take both tablets once day. Feels like her depression and anxiety have improved. 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 States that she feels like her weightloss has been going ok. Will make a follow up. Has lost 8 lbs. Reports that he sleep could improve, but is working on it. Visit Vitals  /88 (BP 1 Location: Left arm, BP Patient Position: Sitting)   Pulse 97   Temp 97.8 °F (36.6 °C) (Oral)   Ht 5' (1.524 m)   Wt 211 lb (95.7 kg)   SpO2 97%   BMI 41.21 kg/m²     Past Medical History:   Diagnosis Date    Abnormal Papanicolaou smear of cervix      per pt HPV POS    Depression     Psychotic disorder (HCC)     stress anxiety     Past Surgical History:   Procedure Laterality Date    HX  SECTION       Family History   Problem Relation Age of Onset    Depression Mother     Alcohol abuse Sister      Outpatient Encounter Medications as of 2021   Medication Sig Dispense Refill    buPROPion XL (WELLBUTRIN XL) 150 mg tablet Take 1 Tablet by mouth every morning. 60 Tablet 1    phentermine (ADIPEX-P) 37.5 mg tablet Take 1 Tablet by mouth every morning. Max Daily Amount: 37.5 mg. 30 Tablet 0    loratadine-pseudoephedrine (Claritin-D 24 Hour)  mg per tablet Take 1 Tab by mouth daily.  omega-3s/dha/epa/fish oil/D3 (VITAMIN-D + OMEGA-3 PO) Take  by mouth.  [DISCONTINUED] buPROPion (WELLBUTRIN) 75 mg tablet Take 1 Tab by mouth two (2) times a day. 60 Tab 1     No facility-administered encounter medications on file as of 2021. HPI    Review of Systems   Constitutional: Negative. Respiratory: Negative. Cardiovascular: Negative. Gastrointestinal: Negative. Musculoskeletal: Negative. Neurological: Negative. Psychiatric/Behavioral: Positive for depression. The patient is nervous/anxious. Physical Exam  Vitals and nursing note reviewed. Constitutional:       Appearance: She is not ill-appearing. HENT:      Head: Normocephalic. Cardiovascular:      Rate and Rhythm: Normal rate and regular rhythm. Pulmonary:      Effort: Pulmonary effort is normal.      Breath sounds: Normal breath sounds. Abdominal:      General: Bowel sounds are normal.      Palpations: Abdomen is soft. Skin:     General: Skin is warm. Neurological:      Mental Status: She is alert and oriented to person, place, and time. Psychiatric:         Behavior: Behavior normal.         ASSESSMENT and PLAN  Diagnoses and all orders for this visit:    1. Depression, unspecified depression type  -     buPROPion XL (WELLBUTRIN XL) 150 mg tablet; Take 1 Tablet by mouth every morning. 2. Anxiety    3. Obesity (BMI 30-39.9)   - will follow up at weight loss clinic.  Will see dietician tomorrow for further plan     Follow-up and Dispositions    · Return in about 6 months (around 1/8/2022), or if symptoms worsen or fail to improve, for Follow up.       lab results and schedule of future lab studies reviewed with patient  reviewed diet, exercise and weight control  reviewed medications and side effects in detail

## 2021-07-09 ENCOUNTER — HOSPITAL ENCOUNTER (OUTPATIENT)
Dept: NUTRITION | Age: 36
Discharge: HOME | End: 2021-07-09
Payer: COMMERCIAL

## 2021-07-09 DIAGNOSIS — E66.9 MILDLY OBESE: ICD-10-CM

## 2021-07-09 PROCEDURE — 97802 MEDICAL NUTRITION INDIV IN: CPT | Performed by: DIETITIAN, REGISTERED

## 2021-07-09 NOTE — PROGRESS NOTES
[address] [facility]     Nutrition Assessment  Medical Nutrition Therapy   Outpatient Initial Evaluation         Patient Name: Ameena Monique : 1985   Treatment Diagnosis: E66.9 (ICD-10-CM) - Mildly obese   Referral Source: Karrie Bermeo NP Start of Care [facility]): 2021     In time:   1000am            Out time:   1055am   Total Treatment Time (min):   55 min     Gender: female Age: 39 y.o. Ht: 60 in Wt:  214 lb 97.1 kg   BMI: 41.8 BMR   Male  BMR Female [id number]     Past Medical History:  Patient Active Problem List   Diagnosis Code    Depression F32.9    Anxiety F41.9        Pertinent Medications:     Current Outpatient Medications:     buPROPion XL (WELLBUTRIN XL) 150 mg tablet, Take 1 Tablet by mouth every morning., Disp: 60 Tablet, Rfl: 1    phentermine (ADIPEX-P) 37.5 mg tablet, Take 1 Tablet by mouth every morning. Max Daily Amount: 37.5 mg., Disp: 30 Tablet, Rfl: 0    loratadine-pseudoephedrine (Claritin-D 24 Hour)  mg per tablet, Take 1 Tab by mouth daily. , Disp: , Rfl:     omega-3s/dha/epa/fish oil/D3 (VITAMIN-D + OMEGA-3 PO), Take  by mouth., Disp: , Rfl:      Biochemical Data:   Lab Results   Component Value Date/Time    Hemoglobin A1c 5.4 2021 10:57 AM     Lab Results   Component Value Date/Time    Sodium 141 2021 11:06 AM    Potassium 4.8 2021 11:06 AM    Chloride 103 2021 11:06 AM    CO2 26 2021 11:06 AM    Glucose 96 2021 11:06 AM    BUN 15 2021 11:06 AM    Creatinine 0.64 2021 11:06 AM    BUN/Creatinine ratio 23 2021 11:06 AM    GFR est  2021 11:06 AM    GFR est non- 2021 11:06 AM    Calcium 9.9 2021 11:06 AM    Bilirubin, total 0.3 2021 11:06 AM    Alk.  phosphatase 80 2021 11:06 AM    Protein, total 7.1 2021 11:06 AM    Albumin 4.5 2021 11:06 AM    A-G Ratio 1.7 2021 11:06 AM    ALT (SGPT) 35 (H) 2021 11:06 AM    AST (SGOT) 28 05/06/2021 11:06 AM     No results found for: CHOL, CHOLPOCT, CHOLX, CHLST, CHOLV, HDL, HDLPOC, HDLP, LDL, LDLCPOC, LDLC, DLDLP, VLDLC, VLDL, TGLX, TRIGL, TRIGP, TGLPOCT, CHHD, CHHDX     Assessment:   Pt is a 38 yo female, seen today for weight loss. Pt notes that she has had a hard time losing weight since gaining after moving to [place] from [address] [place] [address]. Pt has recently started taking phentermine and has seen success while taking it. Pt is now looking to make sure she is getting proper nutrients and looking at building lasting lifestyle changes around nutrition habits. Pt works 16 hour shifts two days each week. Pt also has spouse and children to consider in meal preparation. Food & Nutrition: Recent changes Pt has made include preparing meals for work days and logging the foods on those days. Pt has reduced boredom snacking and has been working to increase her water intake. Typical work day:   B- Premier shake, granola bar  S- None   L-  Crackers w/ Pb or vegetable; snack foods  S- None   D- Tuna w/ cream cheese, crackers and vegetable   S- None   Drinks: Water at 80 oz daily    On days off from work pt has more time to prepare a meal at the dinner time. Cravings: more tendency for salty     Meals out:  1x week     Exercise: Steps tracking (10,000)  and Just Dance          Estimate Needs:    Equation( [x] MSJ ; []  HBE; [] [place]; [] other)  * Activity Factor (1.2) -500   Calories: 1500  Protein: 94 Carbs: 169 Fat: 50   Kcal/day  g/day  g/day  g/day        percent: 25  39  30               Nutrition Diagnosis Obesity R/T nutrition related knowledge deficit for healthy weight loss AEB BMI>30, food recall, and Pt questions. Nutrition Intervention &  Education: Educated pt on healthy weight loss and recommended daily intake specific to her individual needs. Self-monitoring with tracking foods on MyPlate el     Discussed recommendation for non-starchy vegetables and getting in a variety of colors.    Pt asked about salad dressing and dips. RD offered suggestions for serving sizes of the various types, lower-sugar brands of dressings, and ideas for greek yogurt dips. Discussed the role of each macronutrient in weight loss and healthy diet. Recommended fluid needs  Goal-Setting    Handouts Provided: []  Carbohydrates  []  Protein  []  Non-starchy Vegatbles  []  Food Label  [x]  Meal and Snack Ideas  []  Food Journals []  Diabetes  []  Cholesterol  []  Sodium  []  Gen Nutr Guidelines  []  SBGM Guidelines  [x]  Others: non-starchy vegetables, Meal Builder   Information Reviewed with: Pt   Readiness to Change Stage: []  Pre-contemplative    []  Contemplative  []  Preparation               [x]  Action                  []  Maintenance   Potential Barriers to Learning: []  Decline in memory    []  Language barrier   []  Other:  []  Emotional                  []  Limited mobility     [x]  None  []  Lack of motivation     [] Vision, hearing or cognitive impairment   Expected Compliance: Pt expressed comprehension, high motivation, and compliance is expected. Nutritional Goal - To promote lifestyle changes to result in:    [x]  Weight loss  []  Improved diabetic control  []  Decreased cholesterol levels  []  Decreased blood pressure  []  Weight maintenance []  Preventing any interactions associated with food allergies  []  Adequate weight gain toward goal weight  []  Other:        Patient Goals:   - Tracking foods at least 3x week w/ focus on types of nutrients. - Add in 2x 30 min sessions of Just Dance exercise on days off from work. - Focus on getting at least 2 bottles of water every day.       Dietitian Signature: Nora Brantley RDN Date: 7/9/2021   Follow-up: 4 weeks Time: 9:59 AM

## 2021-07-22 DIAGNOSIS — Z76.89 ENCOUNTER FOR WEIGHT MANAGEMENT: Primary | ICD-10-CM

## 2021-07-22 DIAGNOSIS — E66.01 MORBID OBESITY (HCC): ICD-10-CM

## 2021-07-25 NOTE — PROGRESS NOTES
Patient attended a VIRTUAL Medically Supervised Weight Loss New Patient Orientation today where we discussed:  - New Direction Very Low and the Low Calorie diet details  - Medical Supervision  - Nutrition education  - Cost of Meal Replacements  Policies and compliance required for program enrollment.

## 2021-08-03 DIAGNOSIS — E66.9 MILDLY OBESE: ICD-10-CM

## 2021-08-03 RX ORDER — PHENTERMINE HYDROCHLORIDE 37.5 MG/1
37.5 TABLET ORAL
Qty: 30 TABLET | Refills: 0 | Status: SHIPPED | OUTPATIENT
Start: 2021-08-03 | End: 2021-09-02 | Stop reason: SDUPTHER

## 2021-08-11 ENCOUNTER — HOSPITAL ENCOUNTER (OUTPATIENT)
Dept: NUTRITION | Age: 36
Discharge: HOME | End: 2021-08-11
Payer: COMMERCIAL

## 2021-08-11 DIAGNOSIS — E66.9 MILDLY OBESE: ICD-10-CM

## 2021-08-11 PROCEDURE — 97803 MED NUTRITION INDIV SUBSEQ: CPT | Performed by: DIETITIAN, REGISTERED

## 2021-08-11 NOTE — PROGRESS NOTES
NUTRITION  FOLLOW-UP TREATMENT NOTE  Patient Name: Prashanth Lama         Date: 2021  : 1985    YES Patient  Verified  Diagnosis: E66.9 (ICD-10-CM) - Mildly obese   In time:   1050am             Out time:   1120am   Total Treatment Time (min):   30     SUBJECTIVE/ASSESSMENT  Current Wt: 205.6 Previous Wt: 214 Wt Change: -8.4     Initial Wt: 214 Total Wt change: -8.4 Height: 60     Changes in medication or medical history? Any new allergies, surgeries or procedures? NO    If yes, update Summary List            Nutrition Diagnosis        Diagnosis Status:   Obesity R/T nutrition related knowledge deficit for healthy weight loss AEB BMI>30, food recall, and Pt questions. [x]  Improved []  No Change    []  Declined   []  Discontinued        Nutrition Monitoring and Evaluation: Pt seen for follow-up visit today; pt has lost 8.4# over the past 4 weeks. Pt noted that she has been busy in her routine but that she has been mindful about planning meals for the day and making sure to prioritize protein and non-starchy vegetables with the meals. Pt had a weekend of travel and packed snacks for the drive. Pt noted eating meals out but doing smaller portions than previously would have done. Pt has struggled to drink water, stating that it feels lie she is forcing herself to drink it despite the fact that she is actually thirsty. Discussed doing flavors through infusing frozen fruit. Previous Goals:   Not met. Discontinue for now, with understanding needs of various food groups and portion sizes. - Tracking foods at least 3x week w/ focus on types of nutrients. Not met. Continue. - Add in 2x 30 min sessions of Just Dance exercise on days off from work. Met 50%. - Focus on getting at least 2 bottles of water every day. Nutrition Prescription and  Intervention Educated pt on healthy weight loss and recommended daily intake specific to her individual needs.    Self-monitoring with tracking foods on MyPlate el     Discussed recommendation for non-starchy vegetables and getting in a variety of colors. Pt asked about salad dressing and dips. RD offered suggestions for serving sizes of the various types, lower-sugar brands of dressings, and ideas for greek yogurt dips. Discussed artifical sweeteners. Discussed the role of each macronutrient in weight loss and healthy diet. Recommended fluid needs  Goal-Setting      Patient Education:  [x]  Review current plan with patient   []  Other:    Handouts/  Information Provided: []  Carbohydrates  []  Protein  []  Fiber  []  Serving Sizes  []  Fluids  []  General guidelines []  Diabetes  []  Cholesterol  []  Sodium  []  SBGM  []  Food Journals  []  Others:      Patient Goals  - Add in 2x 30 min sessions of Just Dance exercise on days off from work. - Focus on getting at least 2 bottles of water every day, try adding frozen fruit to change up flavor options. - Plan a list of healthy snacks to have on hand for when school starts. PLAN  [x]  Continue on current plan []  Follow-up PRN   []  Discharge due to :    [x]  Next appt: 4 weeks.       Dietitian: Cong Cuevas RDN    Date: 8/11/2021 Time: 10:23 AM

## 2021-09-01 ENCOUNTER — HOSPITAL ENCOUNTER (OUTPATIENT)
Dept: NUTRITION | Age: 36
Discharge: HOME | End: 2021-09-01
Payer: COMMERCIAL

## 2021-09-01 DIAGNOSIS — E66.9 MILDLY OBESE: ICD-10-CM

## 2021-09-01 PROCEDURE — 97803 MED NUTRITION INDIV SUBSEQ: CPT | Performed by: DIETITIAN, REGISTERED

## 2021-09-01 NOTE — PROGRESS NOTES
NUTRITION  FOLLOW-UP TREATMENT NOTE  Patient Name: Derek Ferrer         Date: 2021  : 1985    YES Patient  Verified  Diagnosis: E66.9 (ICD-10-CM) - Mildly obese   In time:   1125am             Out time:   1155am   Total Treatment Time (min):   30     SUBJECTIVE/ASSESSMENT  Current Wt: 202.8 Previous Wt: 205.6 Wt Change: -2.8     Initial Wt: 214 Total Wt change: -8.4 Height: 60     Changes in medication or medical history? Any new allergies, surgeries or procedures? NO    If yes, update Summary List        Nutrition Diagnosis        Diagnosis Status:   Obesity R/T nutrition related knowledge deficit for healthy weight loss AEB BMI>30, food recall, and Pt questions. [x]  Improved []  No Change    []  Declined   []  Discontinued        Nutrition Monitoring and Evaluation: Pt seen for follow-up visit today; pt has lost 2.8# over the past 4 weeks. Pt has started school and had a busy schedule with school and work balance. Pt has noticed that she is not eating as much with being busy and missing meals in the middle of the day. Pt has been consistent with dinner meal each day. Pt has also resolved aversion to water but has not been remembering to drink while working in the day. Pt has been able to see changes in clothing fitting more loosely and has been reducing portion sizes at meals. Previous Goals:   Not met. Continue.- Add in 2x 30 min sessions of Just Dance exercise on days off from work. Met. - Focus on getting at least 2 bottles of water every day, try adding frozen fruit to change up flavor options. Not met. - Plan a list of healthy snacks to have on hand for when school starts. Nutrition Prescription and  Intervention Educated pt on healthy weight loss and recommended daily intake specific to her individual needs. Self-monitoring with tracking foods on MyPlate el     Discussed recommendation for non-starchy vegetables and getting in a variety of colors.    Pt asked about salad dressing and dips. RD offered suggestions for serving sizes of the various types, lower-sugar brands of dressings, and ideas for greek yogurt dips. Discussed artifical sweeteners. Discussed the role of each macronutrient in weight loss and healthy diet. Recommended fluid needs  Goal-Setting      Patient Education:  [x]  Review current plan with patient   []  Other:    Handouts/  Information Provided: []  Carbohydrates  []  Protein  []  Fiber  []  Serving Sizes  []  Fluids  []  General guidelines []  Diabetes  []  Cholesterol  []  Sodium  []  SBGM  []  Food Journals  [x]  Others: 100 calorie snack ideas. Patient Goals - Set reminders to eat meals and snacks in the day. - Add in 2x 30 min sessions of Just Dance exercise on days off from work. PLAN  [x]  Continue on current plan []  Follow-up PRN   []  Discharge due to :    [x]  Next appt: 4 weeks.       Dietitian: Chan Nieto RDN    Date: 9/1/2021 Time: 10:23 AM

## 2021-09-02 DIAGNOSIS — E66.9 MILDLY OBESE: ICD-10-CM

## 2021-09-02 RX ORDER — PHENTERMINE HYDROCHLORIDE 37.5 MG/1
37.5 TABLET ORAL
Qty: 30 TABLET | Refills: 0 | Status: SHIPPED | OUTPATIENT
Start: 2021-09-02 | End: 2021-10-07 | Stop reason: SDUPTHER

## 2021-09-15 ENCOUNTER — OFFICE VISIT (OUTPATIENT)
Dept: SURGERY | Age: 36
End: 2021-09-15
Payer: COMMERCIAL

## 2021-09-15 VITALS
RESPIRATION RATE: 18 BRPM | DIASTOLIC BLOOD PRESSURE: 88 MMHG | HEART RATE: 117 BPM | OXYGEN SATURATION: 98 % | HEIGHT: 60 IN | BODY MASS INDEX: 39.95 KG/M2 | SYSTOLIC BLOOD PRESSURE: 124 MMHG | WEIGHT: 203.5 LBS

## 2021-09-15 DIAGNOSIS — E65 OBESE ABDOMEN: ICD-10-CM

## 2021-09-15 PROCEDURE — 99214 OFFICE O/P EST MOD 30 MIN: CPT | Performed by: INTERNAL MEDICINE

## 2021-09-15 NOTE — PROGRESS NOTES
[facility] [address] [facility] Eliel Perez is a 39 y.o. female. Patient was seen for a follow up at the weight loss clinic. Patient has lost almost 20 lbs since starting the program! Feels great and is satisfied. Reports that she continues to take the phentermine daily. Often meal preps and takes her snacks with her. Has placed herself on am eating schedule to ensure she is getting enough caloris daily. States that her water intake can be better. Often snacks on nuts, bars and cheese. No too much exercise currently going on. Reports that for the next few months she will  extra shifts that will be overnight. No barriers, but is continuing to go through a divorce that is prolonged. Does have the support of her new partner. Will continue to see the dietician after payment is figured out. Visit Vitals  /88 (BP 1 Location: Left arm, BP Patient Position: Sitting)   Pulse (!) 117   Resp 18   Ht 5' (1.524 m)   Wt 203 lb 8 oz (92.3 kg)   LMP 08/15/2021   SpO2 98%   BMI 39.74 kg/m²     Past Medical History:   Diagnosis Date    Abnormal Papanicolaou smear of cervix      per pt HPV POS    Depression     Psychotic disorder (HCC)     stress anxiety     Past Surgical History:   Procedure Laterality Date    HX  SECTION       Family History   Problem Relation Age of Onset    Depression Mother     Alcohol abuse Sister      Outpatient Encounter Medications as of 9/15/2021   Medication Sig Dispense Refill    buPROPion XL (WELLBUTRIN XL) 150 mg tablet Take 1 Tablet by mouth every morning. 90 Tablet 1    phentermine (ADIPEX-P) 37.5 mg tablet Take 1 Tablet by mouth every morning. Max Daily Amount: 37.5 mg. 30 Tablet 0    loratadine-pseudoephedrine (Claritin-D 24 Hour)  mg per tablet Take 1 Tab by mouth daily.  omega-3s/dha/epa/fish oil/D3 (VITAMIN-D + OMEGA-3 PO) Take  by mouth.        No facility-administered encounter medications on file as of 9/15/2021. Outpatient Encounter Medications as of 9/15/2021   Medication Sig Dispense Refill    buPROPion XL (WELLBUTRIN XL) 150 mg tablet Take 1 Tablet by mouth every morning. 90 Tablet 1    phentermine (ADIPEX-P) 37.5 mg tablet Take 1 Tablet by mouth every morning. Max Daily Amount: 37.5 mg. 30 Tablet 0    loratadine-pseudoephedrine (Claritin-D 24 Hour)  mg per tablet Take 1 Tab by mouth daily.  omega-3s/dha/epa/fish oil/D3 (VITAMIN-D + OMEGA-3 PO) Take  by mouth. No facility-administered encounter medications on file as of 9/15/2021. HPI    Review of Systems   Constitutional: Negative. Respiratory: Negative. Cardiovascular: Negative. Gastrointestinal: Negative. Genitourinary: Negative. Musculoskeletal: Negative. Neurological: Negative. Psychiatric/Behavioral: Negative. Physical Exam  Vitals and nursing note reviewed. Constitutional:       Appearance: She is obese. HENT:      Head: Normocephalic. Cardiovascular:      Rate and Rhythm: Normal rate and regular rhythm. Pulmonary:      Effort: Pulmonary effort is normal.      Breath sounds: Normal breath sounds. Abdominal:      Palpations: Abdomen is soft. Skin:     General: Skin is warm. Neurological:      Mental Status: She is alert and oriented to person, place, and time. Psychiatric:         Behavior: Behavior normal.         ASSESSMENT and PLAN  Diagnoses and all orders for this visit:    1. BMI 39.0-39.9,adult  - will continue on phentermine daily. -encouraged healthy snacks throughout the day (went over sources of protein)  - encouraged an increase in water intake to 1- 1/12 L a day  -will increase sleep as tolerated to 6-7 hours daily. Awaiting work schedule to be determined   -encouraged some form of exercise at least 30-45 minutes a week   -will follow up with dietician as scheduled.    2. Obese abdomen      Follow-up and Dispositions    · Return in about 1 month (around 10/15/2021), or if symptoms worsen or fail to improve, for Follow up.       lab results and schedule of future lab studies reviewed with patient  reviewed diet, exercise and weight control  reviewed medications and side effects in detail

## 2021-10-07 DIAGNOSIS — E66.9 MILDLY OBESE: ICD-10-CM

## 2021-10-07 RX ORDER — PHENTERMINE HYDROCHLORIDE 37.5 MG/1
37.5 TABLET ORAL
Qty: 30 TABLET | Refills: 0 | Status: SHIPPED | OUTPATIENT
Start: 2021-10-07 | End: 2021-11-04 | Stop reason: SDUPTHER

## 2021-10-12 ENCOUNTER — APPOINTMENT (OUTPATIENT)
Dept: NUTRITION | Age: 36
End: 2021-10-12

## 2021-10-20 ENCOUNTER — OFFICE VISIT (OUTPATIENT)
Dept: SURGERY | Age: 36
End: 2021-10-20
Payer: COMMERCIAL

## 2021-10-20 VITALS
TEMPERATURE: 98.4 F | BODY MASS INDEX: 38.77 KG/M2 | HEIGHT: 60 IN | DIASTOLIC BLOOD PRESSURE: 68 MMHG | SYSTOLIC BLOOD PRESSURE: 115 MMHG | HEART RATE: 112 BPM | OXYGEN SATURATION: 98 % | RESPIRATION RATE: 18 BRPM | WEIGHT: 197.5 LBS

## 2021-10-20 DIAGNOSIS — F32.A DEPRESSION, UNSPECIFIED DEPRESSION TYPE: ICD-10-CM

## 2021-10-20 PROCEDURE — 99214 OFFICE O/P EST MOD 30 MIN: CPT | Performed by: INTERNAL MEDICINE

## 2021-10-20 RX ORDER — BUPROPION HYDROCHLORIDE 150 MG/1
300 TABLET ORAL
Qty: 90 TABLET | Refills: 1 | Status: SHIPPED | OUTPATIENT
Start: 2021-10-20 | End: 2021-12-29 | Stop reason: SDUPTHER

## 2021-10-20 NOTE — PROGRESS NOTES
[facility] at [address] is a 39 y.o. female. Patient was seen for a follow up. Has lost another 5 lbs since last visit. Overall feels great. Reports that he daily intake fluctuates between 7520-9923 calories a day. Patient states that at times she has no appetite, but does eat at least 2 meals, of snacks. Needs to increase her water intake. Just got engaged last night. Is working day shifts, but picking up extra shifts weekly for the incentive pay. Reports about 4-5 hours daily, and tries to sleep more during the weekend. Also reports that at times she finds her mood fluctuating. Does not feel anxious, but still feels lows and highs often with her moods. Is currently seeing me in PCP for depression as well. Would like to consider going up on this. No thoughts on harming herself or others. Visit Vitals  /68 (BP 1 Location: Right arm)   Pulse (!) 112   Temp 98.4 °F (36.9 °C)   Resp 18   Ht 5' (1.524 m)   Wt 197 lb 8 oz (89.6 kg)   SpO2 98%   BMI 38.57 kg/m²     Past Medical History:   Diagnosis Date    Abnormal Papanicolaou smear of cervix      per pt HPV POS    Depression     Psychotic disorder ([address]     stress anxiety     Past Surgical History:   Procedure Laterality Date    HX  SECTION       Family History   Problem Relation Age of Onset    Depression Mother     Alcohol abuse Sister      Outpatient Encounter Medications as of 10/20/2021   Medication Sig Dispense Refill    buPROPion XL (WELLBUTRIN XL) 150 mg tablet Take 2 Tablets by mouth every morning. 90 Tablet 1    phentermine (ADIPEX-P) 37.5 mg tablet Take 1 Tablet by mouth every morning. Max Daily Amount: 37.5 mg. 30 Tablet 0    loratadine-pseudoephedrine (Claritin-D 24 Hour)  mg per tablet Take 1 Tab by mouth daily.  omega-3s/dha/epa/fish oil/D3 (VITAMIN-D + OMEGA-3 PO) Take  by mouth.       [DISCONTINUED] buPROPion XL [facility] XL) 150 mg tablet Take 1 Tablet by mouth every morning. 90 Tablet 1     No facility-administered encounter medications on file as of 10/20/2021. HPI    Review of Systems   Constitutional: Negative. Respiratory: Negative. Cardiovascular: Negative. Gastrointestinal: Negative. Genitourinary: Negative. Neurological: Negative. Psychiatric/Behavioral: Positive for depression. Physical Exam  Vitals and nursing note reviewed. Cardiovascular:      Rate and Rhythm: Normal rate and regular rhythm. Pulmonary:      Effort: Pulmonary effort is normal.      Breath sounds: Normal breath sounds. Musculoskeletal:         General: Normal range of motion. Skin:     General: Skin is warm. Neurological:      Mental Status: She is alert and oriented to person, place, and time. Psychiatric:         Behavior: Behavior normal.         Thought Content: Thought content normal.         ASSESSMENT and PLAN  Diagnoses and all orders for this visit:    1. BMI 39.0-39.9,adult        -     Continue phentermine 37.5 mg daily        -     Continue water intake to reach 2L daily         -     Encourage more sleep based on schedule           2. Depression, unspecified depression type  -     buPROPion XL (WELLBUTRIN XL) 150 mg tablet; Take 2 Tablets by mouth every morning.       Follow-up and Dispositions    · Return in about 1 month (around 11/20/2021), or if symptoms worsen or fail to improve, for Follow up.       lab results and schedule of future lab studies reviewed with patient  reviewed diet, exercise and weight control  reviewed medications and side effects in detail

## 2021-10-20 NOTE — PROGRESS NOTES
1. Have you been to the ER, urgent care clinic since your last visit? Hospitalized since your last visit? No    2. Have you seen or consulted any other health care providers outside of the [address] [place] [address] since your last visit? Include any pap smears or colon screening.  No

## 2021-11-04 DIAGNOSIS — E66.9 MILDLY OBESE: ICD-10-CM

## 2021-11-04 RX ORDER — PHENTERMINE HYDROCHLORIDE 37.5 MG/1
37.5 TABLET ORAL
Qty: 30 TABLET | Refills: 0 | Status: SHIPPED | OUTPATIENT
Start: 2021-11-04 | End: 2022-01-14

## 2021-11-17 ENCOUNTER — OFFICE VISIT (OUTPATIENT)
Dept: SURGERY | Age: 36
End: 2021-11-17
Payer: COMMERCIAL

## 2021-11-17 VITALS
TEMPERATURE: 98 F | WEIGHT: 193.8 LBS | OXYGEN SATURATION: 96 % | HEART RATE: 114 BPM | BODY MASS INDEX: 38.05 KG/M2 | RESPIRATION RATE: 16 BRPM | SYSTOLIC BLOOD PRESSURE: 118 MMHG | DIASTOLIC BLOOD PRESSURE: 88 MMHG | HEIGHT: 60 IN

## 2021-11-17 DIAGNOSIS — E66.01 CLASS 2 SEVERE OBESITY DUE TO EXCESS CALORIES WITH SERIOUS COMORBIDITY AND BODY MASS INDEX (BMI) OF 37.0 TO 37.9 IN ADULT (HCC): Primary | ICD-10-CM

## 2021-11-17 PROCEDURE — 99214 OFFICE O/P EST MOD 30 MIN: CPT | Performed by: INTERNAL MEDICINE

## 2021-11-17 NOTE — PROGRESS NOTES
1. Have you been to the ER, urgent care clinic since your last visit? Hospitalized since your last visit? No    2. Have you seen or consulted any other health care providers outside of the [address] [place] [address] since your last visit? Include any pap smears or colon screening.  No

## 2021-11-17 NOTE — PROGRESS NOTES
[facility] at [address] [facility] Sadaf Gregory is a 39 y.o. female. Patient was seen for a follow up. Has lost another 3 lbs since her last visit. Has lost a total of 26 lbs since starting. Feels good and is ready to keep going. Reports that this week she felt a lot less hungry, but still ate. Knows that her water intake as well has been down. Is loving yogurt with blueberries most of the time. Is looking forward to her wedding next year and just got a new car. Is having non scale victories. Is able to fit in her son josé miguel. Denies any N/V or GI pain  Visit Vitals  /88 (BP 1 Location: Left arm)   Pulse (!) 114   Temp 98 °F (36.7 °C)   Resp 16   Ht 5' (1.524 m)   Wt 193 lb 12.8 oz (87.9 kg)   SpO2 96%   BMI 37.85 kg/m²     Past Medical History:   Diagnosis Date    Abnormal Papanicolaou smear of cervix      per pt HPV POS    Depression     Psychotic disorder (HCC)     stress anxiety     Past Surgical History:   Procedure Laterality Date    HX  SECTION       Family History   Problem Relation Age of Onset    Depression Mother     Alcohol abuse Sister      Outpatient Encounter Medications as of 2021   Medication Sig Dispense Refill    phentermine (ADIPEX-P) 37.5 mg tablet Take 1 Tablet by mouth every morning. Max Daily Amount: 37.5 mg. 30 Tablet 0    buPROPion XL (WELLBUTRIN XL) 150 mg tablet Take 2 Tablets by mouth every morning. 90 Tablet 1    loratadine-pseudoephedrine (Claritin-D 24 Hour)  mg per tablet Take 1 Tab by mouth daily.  omega-3s/dha/epa/fish oil/D3 (VITAMIN-D + OMEGA-3 PO) Take  by mouth. No facility-administered encounter medications on file as of 2021. HPI    Review of Systems   All other systems reviewed and are negative. Physical Exam  Vitals and nursing note reviewed. Cardiovascular:      Rate and Rhythm: Normal rate and regular rhythm.    Pulmonary: Effort: Pulmonary effort is normal.      Breath sounds: Normal breath sounds. Skin:     General: Skin is warm. Neurological:      Mental Status: She is alert and oriented to person, place, and time. Psychiatric:         Behavior: Behavior normal.         ASSESSMENT and PLAN  Diagnoses and all orders for this visit:    1. Class 2 severe obesity due to excess calories with serious comorbidity and body mass index (BMI) of 37.0 to 37.9 in adult [facility])  - will hold phentermine for now. Will attempt to see if this needs to be taken  -encourage water intake, 64 oz daily.   - encouraged protein and vegetable like sources    Follow-up and Dispositions    · Return in about 1 month (around 12/17/2021), or if symptoms worsen or fail to improve.       lab results and schedule of future lab studies reviewed with patient  reviewed diet, exercise and weight control  reviewed medications and side effects in detail

## 2021-12-29 DIAGNOSIS — F32.A DEPRESSION, UNSPECIFIED DEPRESSION TYPE: ICD-10-CM

## 2021-12-29 RX ORDER — BUPROPION HYDROCHLORIDE 150 MG/1
300 TABLET ORAL
Qty: 90 TABLET | Refills: 1 | Status: SHIPPED | OUTPATIENT
Start: 2021-12-29 | End: 2022-02-24 | Stop reason: SDUPTHER

## 2022-01-14 ENCOUNTER — OFFICE VISIT (OUTPATIENT)
Dept: INTERNAL MEDICINE CLINIC | Age: 37
End: 2022-01-14
Payer: COMMERCIAL

## 2022-01-14 VITALS
TEMPERATURE: 98.1 F | RESPIRATION RATE: 18 BRPM | OXYGEN SATURATION: 98 % | SYSTOLIC BLOOD PRESSURE: 118 MMHG | HEART RATE: 87 BPM | BODY MASS INDEX: 38.87 KG/M2 | DIASTOLIC BLOOD PRESSURE: 86 MMHG | WEIGHT: 198 LBS | HEIGHT: 60 IN

## 2022-01-14 DIAGNOSIS — F41.9 ANXIETY: ICD-10-CM

## 2022-01-14 DIAGNOSIS — E66.9 MILDLY OBESE: ICD-10-CM

## 2022-01-14 DIAGNOSIS — F32.A DEPRESSION, UNSPECIFIED DEPRESSION TYPE: Primary | ICD-10-CM

## 2022-01-14 PROCEDURE — 99213 OFFICE O/P EST LOW 20 MIN: CPT | Performed by: INTERNAL MEDICINE

## 2022-01-14 NOTE — PROGRESS NOTES
Health Maintenance Due   Topic Date Due    DTaP/Tdap/Td series (1 - Tdap) Never done    COVID-19 Vaccine (3 - Booster for [address] [employer] series) 11/20/2021       Chief Complaint   Patient presents with    Medication Evaluation    Depression    Anxiety       1. Have you been to the ER, urgent care clinic since your last visit? Hospitalized since your last visit? No    2. Have you seen or consulted any other health care providers outside of the [address] [place] [address] since your last visit? Include any pap smears or colon screening. No    3) Do you have an Advance Directive on file? no    4) Are you interested in receiving information on Advance Directives? NO      Patient is accompanied by self I have received verbal consent from Connor Serrano to discuss any/all medical information while they are present in the room.

## 2022-02-24 DIAGNOSIS — F32.A DEPRESSION, UNSPECIFIED DEPRESSION TYPE: ICD-10-CM

## 2022-02-24 RX ORDER — BUPROPION HYDROCHLORIDE 150 MG/1
300 TABLET ORAL
Qty: 90 TABLET | Refills: 1 | Status: SHIPPED | OUTPATIENT
Start: 2022-02-24

## 2022-02-24 NOTE — TELEPHONE ENCOUNTER
[facility] pharmacy requesting 90 day (180 tabs) of   Requested Prescriptions     Pending Prescriptions Disp Refills    buPROPion XL (WELLBUTRIN XL) 150 mg tablet 90 Tablet 1     Sig: Take 2 Tablets by mouth every morning.    01/14/2022  07/15/2022

## 2022-03-20 PROBLEM — F32.A DEPRESSION: Status: ACTIVE | Noted: 2021-03-25

## 2022-03-20 PROBLEM — F41.9 ANXIETY: Status: ACTIVE | Noted: 2021-03-25

## 2023-05-22 RX ORDER — BUPROPION HYDROCHLORIDE 150 MG/1
2 TABLET ORAL EVERY MORNING
COMMUNITY
Start: 2022-02-24

## 2023-05-22 RX ORDER — LORATADINE PSEUDOEPHEDRINE SULFATE 10; 240 MG/1; MG/1
1 TABLET, EXTENDED RELEASE ORAL DAILY
COMMUNITY